# Patient Record
Sex: FEMALE | Race: WHITE
[De-identification: names, ages, dates, MRNs, and addresses within clinical notes are randomized per-mention and may not be internally consistent; named-entity substitution may affect disease eponyms.]

---

## 2019-11-08 ENCOUNTER — HOSPITAL ENCOUNTER (OUTPATIENT)
Dept: HOSPITAL 65 - LAB | Age: 34
Discharge: HOME | End: 2019-11-08
Attending: OBSTETRICS & GYNECOLOGY
Payer: MEDICAID

## 2019-11-08 DIAGNOSIS — Z3A.15: ICD-10-CM

## 2019-11-08 DIAGNOSIS — O09.512: Primary | ICD-10-CM

## 2019-11-08 LAB
BASOPHILS # BLD AUTO: 0 10^3/UL (ref 0–0.1)
BASOPHILS NFR BLD AUTO: 0.2 % (ref 0–0.2)
EOSINOPHIL # BLD AUTO: 0 10^3/UL (ref 0–0.2)
EOSINOPHIL NFR BLD AUTO: 0.4 % (ref 0–5)
ERYTHROCYTE [DISTWIDTH] IN BLOOD BY AUTOMATED COUNT: 13.1 % (ref 11.5–14.5)
HGB BLD-MCNC: 12.9 G/DL (ref 12–15)
LYMPHOCYTES # BLD AUTO: 2.3 10^3/UL (ref 1–4.8)
LYMPHOCYTES NFR BLD AUTO: 20.3 % (ref 24–44)
MCH RBC QN AUTO: 32.4 PG (ref 26–34)
MCHC RBC AUTO-ENTMCNC: 34.3 G/DL (ref 33–37)
MCV RBC AUTO: 94.5 FL (ref 78–100)
MONOCYTES # BLD AUTO: 0.7 10^3/UL (ref 0.3–0.8)
MONOCYTES NFR BLD AUTO: 6.2 % (ref 5–12)
NEUTROPHILS # BLD AUTO: 8 10^3/UL (ref 1.8–7.7)
NEUTROPHILS NFR BLD AUTO: 72.3 % (ref 41–85)
PLATELET # BLD AUTO: 327 10^3/UL (ref 150–400)
PMV BLD AUTO: 8.9 FL (ref 7.8–11)
WBC # BLD AUTO: 11.1 10^3/UL (ref 4.5–11)

## 2019-11-08 PROCEDURE — 86592 SYPHILIS TEST NON-TREP QUAL: CPT

## 2019-11-08 PROCEDURE — 36415 COLL VENOUS BLD VENIPUNCTURE: CPT

## 2019-11-08 PROCEDURE — 86900 BLOOD TYPING SEROLOGIC ABO: CPT

## 2019-11-08 PROCEDURE — 85025 COMPLETE CBC W/AUTO DIFF WBC: CPT

## 2019-11-08 PROCEDURE — 86762 RUBELLA ANTIBODY: CPT

## 2019-11-08 PROCEDURE — 86318 IA INFECTIOUS AGENT ANTIBODY: CPT

## 2019-12-06 ENCOUNTER — HOSPITAL ENCOUNTER (EMERGENCY)
Dept: HOSPITAL 65 - ER | Age: 34
Discharge: HOME | End: 2019-12-06
Payer: MEDICAID

## 2019-12-06 VITALS — DIASTOLIC BLOOD PRESSURE: 82 MMHG | SYSTOLIC BLOOD PRESSURE: 112 MMHG

## 2019-12-06 VITALS — SYSTOLIC BLOOD PRESSURE: 110 MMHG | DIASTOLIC BLOOD PRESSURE: 83 MMHG

## 2019-12-06 VITALS — HEIGHT: 67 IN | WEIGHT: 187 LBS | BODY MASS INDEX: 29.35 KG/M2
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
## 2019-12-06 DIAGNOSIS — O9A.212: ICD-10-CM

## 2019-12-06 DIAGNOSIS — Z3A.19: ICD-10-CM
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
## 2019-12-06 DIAGNOSIS — S39.012A: ICD-10-CM

## 2019-12-06 DIAGNOSIS — O26.892: Primary | ICD-10-CM

## 2019-12-06 PROCEDURE — 99285 EMERGENCY DEPT VISIT HI MDM: CPT

## 2019-12-06 PROCEDURE — 76805 OB US >/= 14 WKS SNGL FETUS: CPT
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
## 2019-12-13 ENCOUNTER — HOSPITAL ENCOUNTER (OUTPATIENT)
Dept: HOSPITAL 65 - RAD | Age: 34
Discharge: HOME | End: 2019-12-13
Attending: OBSTETRICS & GYNECOLOGY
Payer: MEDICAID

## 2019-12-13 DIAGNOSIS — Z3A.20: ICD-10-CM

## 2019-12-13 DIAGNOSIS — O09.522: Primary | ICD-10-CM

## 2019-12-13 PROCEDURE — 76805 OB US >/= 14 WKS SNGL FETUS: CPT

## 2019-12-13 NOTE — DIREP
PROCEDURE:OBSTETRICAL ULTRASOUND, 2nd AND 3rd TRIMESTER

 

TECHNIQUE:Transabdominal ultrasound of the pelvic contents was performed.  

 

COMPARISON:[facility], US, US OB 2 3TRI DETAILED TRANSABD, 

12/06/2019, 12:59 PM.

 

INDICATIONS:Z3A.19 IUP @ 19 WEEKS

 

FINDINGS:

 

FETAL NUMBER:Mancera.

POSITION:Breech

LVP:5.7 cm (normal is 2-10 cm).

PLACENTA:Anterior, no previa.  

CERVIX:4.6 cm, closed.  

HEART RATE:136 bpm

 

BIPARIETAL DIAMETER:4.5 cm (19 W 3 D), 33.4 percentile

HEAD CIRCUMFERENCE:17.3 cm (19 W 6 D), 34.5 percentile

ABD CIRCUMFERENCE:15.6 cm (20 W 3 D), 69.9 percentile

FEMUR LENGTH:3.3 cm (19 W 6 D), 54 percentile

ESTIMATED FETAL WEIGHT:350.5 gm (0 lb, 12oz), 67 percentile

 

ULTRASOUND GA:20 W 1 D

ULTRASOUND SUKHJINDER:April 30, 2020

 

LMP: 7/26/19

CLINICAL GA:                                 20 W 0 D

CLINICAL SUKHJINDER:                               May 1, 2020

 

 

FETAL ANATOMY

CEREBELLUM:2.1 cm

NUCHAL FOLD:5.9 mm

CISTERNA MAGNA:4.3 mm

LATERAL CEREBRAL VENTRICLES:5.7 mm

 

CHOROID PLEXUS:Normal.  

MIDLINE FALX:Normal.  

CAVUM SEPTUM PELLUCIDUM:Present.  

SPINE:Normal

HEART:4 chamber

UPPER LIP:Normal

STOMACH:Present.  

KIDNEYS:Normal - no hydronephrosis.  

BLADDER:Normal.  

UMBILICAL CORD INSERTION:Normal.  

CORD VESSEL NUMBER:3

EXTREMITIES:Present.  

         

 

CONCLUSION:

1.  Single viable IUP 20 weeks 1 day, SUKHJINDER 04/30/2020 and .5 gm (0 lb, 

12oz).  

2.  Breech presentation.  

3.  No fetal anomalies detected.  

4.  Normal percentile growth calculations based on LMP.

 

 

 

Dictated by: MARCELLO Physician on 12/13/2019 at 02:33 PM     

Electronically Signed By: Miriam Whelan MD on 12/13/2019 at 03:32 PM   

   

 

 

 

 

 

bs

## 2020-01-10 ENCOUNTER — HOSPITAL ENCOUNTER (OUTPATIENT)
Dept: HOSPITAL 65 - LAB | Age: 35
Discharge: HOME | End: 2020-01-10
Attending: OBSTETRICS & GYNECOLOGY
Payer: MEDICAID

## 2020-01-10 DIAGNOSIS — O09.522: Primary | ICD-10-CM

## 2020-01-10 DIAGNOSIS — Z3A.24: ICD-10-CM

## 2020-01-10 LAB
ERYTHROCYTE [DISTWIDTH] IN BLOOD BY AUTOMATED COUNT: 12.5 % (ref 11.5–14.5)
MCH RBC QN AUTO: 32.1 PG (ref 26–34)
PLATELET # BLD AUTO: 418 10^3/UL (ref 150–400)

## 2020-01-10 PROCEDURE — 85027 COMPLETE CBC AUTOMATED: CPT

## 2020-01-10 PROCEDURE — 36415 COLL VENOUS BLD VENIPUNCTURE: CPT

## 2020-01-10 PROCEDURE — 82951 GLUCOSE TOLERANCE TEST (GTT): CPT

## 2020-04-22 ENCOUNTER — HOSPITAL ENCOUNTER (OUTPATIENT)
Dept: HOSPITAL 65 - LAB | Age: 35
Discharge: HOME | End: 2020-04-22
Attending: OBSTETRICS & GYNECOLOGY
Payer: MEDICAID

## 2020-04-22 ENCOUNTER — HOSPITAL ENCOUNTER (OUTPATIENT)
Dept: HOSPITAL 65 - NPLAB | Age: 35
Discharge: HOME | End: 2020-04-22
Attending: OBSTETRICS & GYNECOLOGY
Payer: MEDICAID

## 2020-04-22 DIAGNOSIS — Z03.818: Primary | ICD-10-CM

## 2020-04-22 DIAGNOSIS — Z3A.00: ICD-10-CM

## 2020-04-22 DIAGNOSIS — O09.523: Primary | ICD-10-CM

## 2020-04-22 PROCEDURE — 36415 COLL VENOUS BLD VENIPUNCTURE: CPT

## 2020-04-22 PROCEDURE — 86592 SYPHILIS TEST NON-TREP QUAL: CPT

## 2020-04-22 PROCEDURE — 87635 SARS-COV-2 COVID-19 AMP PRB: CPT

## 2020-04-27 ENCOUNTER — HOSPITAL ENCOUNTER (INPATIENT)
Dept: HOSPITAL 65 - LND | Age: 35
LOS: 1 days | Discharge: HOME | DRG: 560 | End: 2020-04-28
Attending: OBSTETRICS & GYNECOLOGY | Admitting: OBSTETRICS & GYNECOLOGY
Payer: MEDICAID

## 2020-04-27 VITALS — BODY MASS INDEX: 34.69 KG/M2 | HEIGHT: 67 IN | WEIGHT: 221 LBS

## 2020-04-27 VITALS — DIASTOLIC BLOOD PRESSURE: 82 MMHG | SYSTOLIC BLOOD PRESSURE: 127 MMHG

## 2020-04-27 DIAGNOSIS — Z3A.39: ICD-10-CM

## 2020-04-27 DIAGNOSIS — Z67.21: ICD-10-CM

## 2020-04-27 LAB
BASOPHILS # BLD AUTO: 0 10^3/UL (ref 0–0.1)
BASOPHILS NFR BLD AUTO: 0.2 % (ref 0–0.2)
EOSINOPHIL # BLD AUTO: 0.1 10^3/UL (ref 0–0.2)
EOSINOPHIL NFR BLD AUTO: 0.7 % (ref 0–5)
ERYTHROCYTE [DISTWIDTH] IN BLOOD BY AUTOMATED COUNT: 13.1 % (ref 11.5–14.5)
LYMPHOCYTES # BLD AUTO: 3.03 10^3/UL1 (ref 1–4.8)
LYMPHOCYTES NFR BLD AUTO: 20.9 % (ref 24–44)
MCH RBC QN AUTO: 31.9 PG (ref 26–34)
MONOCYTES # BLD AUTO: 1.2 10^3/UL (ref 0.3–0.8)
MONOCYTES NFR BLD AUTO: 8.2 % (ref 5–12)
NEUTROPHILS # BLD AUTO: 10 10^3/UL (ref 1.8–7.7)
NEUTROPHILS NFR BLD AUTO: 69 % (ref 41–85)
PLATELET # BLD AUTO: 279 10^3/UL (ref 150–400)

## 2020-04-27 PROCEDURE — 86870 RBC ANTIBODY IDENTIFICATION: CPT

## 2020-04-27 PROCEDURE — 86318 IA INFECTIOUS AGENT ANTIBODY: CPT

## 2020-04-27 PROCEDURE — 36415 COLL VENOUS BLD VENIPUNCTURE: CPT

## 2020-04-27 PROCEDURE — 86592 SYPHILIS TEST NON-TREP QUAL: CPT

## 2020-04-27 PROCEDURE — 87340 HEPATITIS B SURFACE AG IA: CPT

## 2020-04-27 PROCEDURE — 85025 COMPLETE CBC W/AUTO DIFF WBC: CPT

## 2020-04-27 PROCEDURE — 00HU33Z INSERTION OF INFUSION DEVICE INTO SPINAL CANAL, PERCUTANEOUS APPROACH: ICD-10-PCS | Performed by: OBSTETRICS & GYNECOLOGY

## 2020-04-27 PROCEDURE — 86900 BLOOD TYPING SEROLOGIC ABO: CPT

## 2020-04-27 PROCEDURE — 10907ZC DRAINAGE OF AMNIOTIC FLUID, THERAPEUTIC FROM PRODUCTS OF CONCEPTION, VIA NATURAL OR ARTIFICIAL OPENING: ICD-10-PCS | Performed by: OBSTETRICS & GYNECOLOGY

## 2020-04-27 PROCEDURE — 3E033VJ INTRODUCTION OF OTHER HORMONE INTO PERIPHERAL VEIN, PERCUTANEOUS APPROACH: ICD-10-PCS | Performed by: OBSTETRICS & GYNECOLOGY

## 2020-04-27 PROCEDURE — 3E0R3BZ INTRODUCTION OF ANESTHETIC AGENT INTO SPINAL CANAL, PERCUTANEOUS APPROACH: ICD-10-PCS | Performed by: OBSTETRICS & GYNECOLOGY

## 2020-04-27 PROCEDURE — 0KQM0ZZ REPAIR PERINEUM MUSCLE, OPEN APPROACH: ICD-10-PCS | Performed by: OBSTETRICS & GYNECOLOGY

## 2020-04-27 RX ADMIN — DEXTROSE, SODIUM CHLORIDE, SODIUM LACTATE, POTASSIUM CHLORIDE, AND CALCIUM CHLORIDE SCH MLS/HR: 5; .6; .31; .03; .02 INJECTION, SOLUTION INTRAVENOUS at 06:21

## 2020-04-27 RX ADMIN — DEXTROSE, SODIUM CHLORIDE, SODIUM LACTATE, POTASSIUM CHLORIDE, AND CALCIUM CHLORIDE SCH MLS/HR: 5; .6; .31; .03; .02 INJECTION, SOLUTION INTRAVENOUS at 14:21

## 2020-04-27 NOTE — PCM.OBDEL1
OPERATIVE REPORT


OPERATIVE REPORT


39.3- over intact perineum with epidural anesthesia.  Viable female infant 

Apgars 8/9.  Loose nuchal cord reduced at perineum.  Head was delivered. I 

attempted to deliver the body but was unsuccessful.  We waited for the next 

contraction and spontaneously the shoulder presented without patient pushing.  

Patient began pushing and the infant was delivered in its entirety.  Infant was 

handed to mother.  True knot was noted on the cord.  Delayed cord clamping  and 

cut was completed.  Cord blood was obtained.  Placenta was spontaneously 

expelled.  A second-degree perineal laceration was repaired with 3-0 chromic.  

Hemostasis was noted.  Mother and infant are stable.  All counts were correct.  

QBL 10 mm.











JEAN CARLOS EUGENE DO              2020 14:06

## 2020-04-27 NOTE — PCM.HP
OB-Chief Complaint and HPI


Date/Diagnosis


Date:  2020


Time:  09:08


Admit Dx:  


(1) Elective induction of labor planned


SNOMED:  804437973


(2) 39 weeks gestation of pregnancy


ICD Codes:  Z3A.39 - 39 weeks gestation of pregnancy


SNOMED:  97403688


(3) Advanced maternal age (AMA) in pregnancy


SNOMED:  136013923





Chief Complaint/History(PI)


:  2


Para:  1


EDC:  May 1, 2020


Reason for admission:  induction of labor





Past Family/Social History


Patient History:  


Factor V Leiden mutation


  32 MOTHER (Mother is - recently from blood clots.)


No known health problems


  33 FATHER


  G8 BROTHER


  G8 SISTER


Blood Type:  B-


Rubella:  not immune


RPR/VDRL:  Negative


GBS Status:  Negative


HBsAG:  Negative


Provider Note:


34-year-old -0-0-1 at 39.3 presents for IOL.  Patient is feeling occasional

contractions.  Good fetal movement noted.  No LOF or VB. Pregnancy 

uncomplicated.  53 pound weight gain during  period.





AROMclear fluid.





TOCO-1-6 min


FHT-Cat 1





LABS-HIV NR





SVE 70/-2, VTX


VS 98.4,18,75,113/69,95%


ABD-Gravid


EX-FUll ROM, NT, trace edema





A/


35 y/o  @ 39.3


AMA


GBS neg


Blood Type B neg, s/p Rhogam 20


COVID-19 testing negative 20


P/


AROM/Pitocin augmentation


Plans to bottle feed


Plan for Paragard IUP insertion 4-6 weeks PP (BTL at a later date)





OB EXAM


Physical Exam


Vital Signs:  Temperature: 98.4, Source: Oral, Respiratory Rate: 16, BP: 127/82,

Pulse Oximetry: 84, Weight: 221





Vital Signs








  Date Time  Temp Pulse Resp B/P (MAP) Pulse Ox O2 Delivery O2 Flow Rate FiO2


 


20 06:24 98.4 84 16 127/82 (97)  Room Air  








Allergies








Coded Allergies Type Severity Reaction Last Updated Verified


 


  No Known Allergies    20 No








LABS





Laboratory Tests








Test


 20


06:30


 


HIV-1 Antibody


 NON-REACTIVE


(NONREACTIVE)


 


HIV-2 Antibody


 NON-REACTIVE


(NONREACTIVE)

















JEAN CARLOS EUGENE DO              2020 09:17

## 2020-04-28 VITALS — SYSTOLIC BLOOD PRESSURE: 135 MMHG | DIASTOLIC BLOOD PRESSURE: 81 MMHG

## 2020-04-28 VITALS — DIASTOLIC BLOOD PRESSURE: 81 MMHG | SYSTOLIC BLOOD PRESSURE: 135 MMHG

## 2020-04-28 LAB
BASOPHILS # BLD AUTO: 0 10^3/UL (ref 0–0.1)
BASOPHILS NFR BLD AUTO: 0.1 % (ref 0–0.2)
EOSINOPHIL # BLD AUTO: 0.1 10^3/UL (ref 0–0.2)
EOSINOPHIL NFR BLD AUTO: 0.4 % (ref 0–5)
ERYTHROCYTE [DISTWIDTH] IN BLOOD BY AUTOMATED COUNT: 13.2 % (ref 11.5–14.5)
LYMPHOCYTES # BLD AUTO: 2.74 10^3/UL1 (ref 1–4.8)
LYMPHOCYTES NFR BLD AUTO: 16.7 % (ref 24–44)
MCH RBC QN AUTO: 32.2 PG (ref 26–34)
MONOCYTES # BLD AUTO: 1.2 10^3/UL (ref 0.3–0.8)
MONOCYTES NFR BLD AUTO: 7.2 % (ref 5–12)
NEUTROPHILS # BLD AUTO: 12.2 10^3/UL (ref 1.8–7.7)
NEUTROPHILS NFR BLD AUTO: 74.6 % (ref 41–85)
PLATELET # BLD AUTO: 231 10^3/UL (ref 150–400)

## 2020-04-28 NOTE — PRM.DC
OB Discharge Summary


Discharge Summary


Date of Arrival on Unit:  2020


Reason for Visit:  IOL


Discharge Date:  2020


Patient History:  


Factor V Leiden mutation


  32 MOTHER (Mother is - recently from blood clots.)


No known health problems


  33 FATHER


  G8 BROTHER


  G8 SISTER


Procedure(s) & Date(s)


 2020


Complications:  No Complications


Abnormal Lab Results


Activity as tolerated, pelvic rest. OTC Motrin prn,


Medications:  Other


Discharge Diagnosis:  Status Post 


Discharge Disposition:  Stable


Hospital Course


PPD # 1





Patient is doing well today.  Lying in bed with FOB and infant daughter.  States

she is having minimal cramping and has not required pain medication.  She 

declines prescription for pain medication at home.  Lochia appropriate.  

Ambulating and urinating without difficulty.  Tolerating regular diet.  Bottle 

feeding only.





/69,82,96%,98.5


ABD-Firm fundus


EX-1 + pitting edema bilateral, Negative Kraig's, NT





A/


33 y/o  @ 39.3, PPD # 1


AMA


GBS neg


Blood Type B neg, s/p Rhogam 20


COVID-19 testing negative 20


Bottle feeding


P/


Continue PP care. 


Plan for Paragard IUP insertion 4-6 weeks PP (BTL at a later date), reviewed 

with pt. and discussed current COVID-19 restrictions


D/C to home today 


2 week Pradip f/u











JEAN CARLOS EUGENE DO              2020 09:47

## 2020-04-28 NOTE — NUR
pt epidural has been removed after delivery; tip intact according to L&D nurse; pt 
ambulating and voiding; pt denies any pain

## 2020-06-17 ENCOUNTER — HOSPITAL ENCOUNTER (OUTPATIENT)
Dept: HOSPITAL 65 - SURG | Age: 35
Discharge: HOME | DRG: 761 | End: 2020-06-17
Attending: OBSTETRICS & GYNECOLOGY
Payer: MEDICAID

## 2020-06-17 VITALS — SYSTOLIC BLOOD PRESSURE: 126 MMHG | DIASTOLIC BLOOD PRESSURE: 81 MMHG

## 2020-06-17 VITALS — SYSTOLIC BLOOD PRESSURE: 108 MMHG | DIASTOLIC BLOOD PRESSURE: 73 MMHG

## 2020-06-17 VITALS — SYSTOLIC BLOOD PRESSURE: 97 MMHG | DIASTOLIC BLOOD PRESSURE: 51 MMHG

## 2020-06-17 VITALS — DIASTOLIC BLOOD PRESSURE: 44 MMHG | SYSTOLIC BLOOD PRESSURE: 108 MMHG

## 2020-06-17 VITALS — SYSTOLIC BLOOD PRESSURE: 118 MMHG | DIASTOLIC BLOOD PRESSURE: 55 MMHG

## 2020-06-17 VITALS — SYSTOLIC BLOOD PRESSURE: 124 MMHG | DIASTOLIC BLOOD PRESSURE: 64 MMHG

## 2020-06-17 VITALS — SYSTOLIC BLOOD PRESSURE: 134 MMHG | DIASTOLIC BLOOD PRESSURE: 69 MMHG

## 2020-06-17 VITALS — DIASTOLIC BLOOD PRESSURE: 60 MMHG | SYSTOLIC BLOOD PRESSURE: 112 MMHG

## 2020-06-17 VITALS — SYSTOLIC BLOOD PRESSURE: 121 MMHG | DIASTOLIC BLOOD PRESSURE: 87 MMHG

## 2020-06-17 VITALS — SYSTOLIC BLOOD PRESSURE: 105 MMHG | DIASTOLIC BLOOD PRESSURE: 40 MMHG

## 2020-06-17 VITALS — DIASTOLIC BLOOD PRESSURE: 48 MMHG | SYSTOLIC BLOOD PRESSURE: 100 MMHG

## 2020-06-17 VITALS — DIASTOLIC BLOOD PRESSURE: 52 MMHG | SYSTOLIC BLOOD PRESSURE: 98 MMHG

## 2020-06-17 VITALS — SYSTOLIC BLOOD PRESSURE: 108 MMHG | DIASTOLIC BLOOD PRESSURE: 77 MMHG

## 2020-06-17 VITALS — SYSTOLIC BLOOD PRESSURE: 106 MMHG | DIASTOLIC BLOOD PRESSURE: 77 MMHG

## 2020-06-17 VITALS — SYSTOLIC BLOOD PRESSURE: 117 MMHG | DIASTOLIC BLOOD PRESSURE: 68 MMHG

## 2020-06-17 VITALS — DIASTOLIC BLOOD PRESSURE: 62 MMHG | SYSTOLIC BLOOD PRESSURE: 104 MMHG

## 2020-06-17 VITALS — SYSTOLIC BLOOD PRESSURE: 83 MMHG | DIASTOLIC BLOOD PRESSURE: 55 MMHG

## 2020-06-17 VITALS — SYSTOLIC BLOOD PRESSURE: 85 MMHG | DIASTOLIC BLOOD PRESSURE: 55 MMHG

## 2020-06-17 VITALS — SYSTOLIC BLOOD PRESSURE: 99 MMHG | DIASTOLIC BLOOD PRESSURE: 58 MMHG

## 2020-06-17 VITALS — BODY MASS INDEX: 26.68 KG/M2 | WEIGHT: 170 LBS | HEIGHT: 67 IN

## 2020-06-17 VITALS — SYSTOLIC BLOOD PRESSURE: 96 MMHG | DIASTOLIC BLOOD PRESSURE: 68 MMHG

## 2020-06-17 VITALS — SYSTOLIC BLOOD PRESSURE: 125 MMHG | DIASTOLIC BLOOD PRESSURE: 64 MMHG

## 2020-06-17 VITALS — SYSTOLIC BLOOD PRESSURE: 122 MMHG | DIASTOLIC BLOOD PRESSURE: 79 MMHG

## 2020-06-17 VITALS — DIASTOLIC BLOOD PRESSURE: 65 MMHG | SYSTOLIC BLOOD PRESSURE: 113 MMHG

## 2020-06-17 DIAGNOSIS — Z98.890: ICD-10-CM

## 2020-06-17 DIAGNOSIS — Z30.2: Primary | ICD-10-CM

## 2020-06-17 DIAGNOSIS — Z79.899: ICD-10-CM

## 2020-06-17 LAB
ERYTHROCYTE [DISTWIDTH] IN BLOOD BY AUTOMATED COUNT: 12.9 % (ref 11.5–14.5)
MCH RBC QN AUTO: 32 PG (ref 26–34)
PLATELET # BLD AUTO: 304 10^3/UL (ref 150–400)

## 2020-06-17 PROCEDURE — 58605 DIVISION OF FALLOPIAN TUBE: CPT

## 2020-06-17 PROCEDURE — 85027 COMPLETE CBC AUTOMATED: CPT

## 2020-06-17 PROCEDURE — 58670 LAPAROSCOPY TUBAL CAUTERY: CPT

## 2020-06-17 PROCEDURE — 86870 RBC ANTIBODY IDENTIFICATION: CPT

## 2020-06-17 PROCEDURE — 86900 BLOOD TYPING SEROLOGIC ABO: CPT

## 2020-06-17 PROCEDURE — A4217 STERILE WATER/SALINE, 500 ML: HCPCS

## 2020-06-17 PROCEDURE — 36415 COLL VENOUS BLD VENIPUNCTURE: CPT

## 2020-06-17 PROCEDURE — 84703 CHORIONIC GONADOTROPIN ASSAY: CPT

## 2020-06-17 RX ADMIN — SODIUM CHLORIDE, SODIUM LACTATE, POTASSIUM CHLORIDE, AND CALCIUM CHLORIDE SCH MLS/HR: 600; 310; 30; 20 INJECTION, SOLUTION INTRAVENOUS at 07:14

## 2020-06-17 RX ADMIN — SODIUM CHLORIDE, SODIUM LACTATE, POTASSIUM CHLORIDE, AND CALCIUM CHLORIDE SCH MLS/HR: 600; 310; 30; 20 INJECTION, SOLUTION INTRAVENOUS at 10:50

## 2020-06-17 NOTE — PRM.OPH
OPERATIVE REPORT


OPERATIVE REPORT


DATE OF SURGERY: 6/17/2020








PREOPERATIVE DIAGNOSIS: Request for permanent sterilization





POSTOPERATIVE DIAGNOSIS: Same





PROCEDURE: Laparoscopic bilateral tubal ligation via risk reducing bilateral 

salpingectomy





SURGEON:  Jean Carlos Pope DO





ASSISTANT: Irving Natarajan





ANESTHESIA: Spinal and general





BLOOD LOSS: 20 mL.





SPECIMENS: Bilateral fallopian tubes





URINE: To be determined in PACU





DISPOSITION: Stable





PROCEDURE IN DETAIL: The patient was brought to the operating room where spinal 

anesthesia was found to be adequate.  She was then prepped and draped in the nor

mal sterile fashion and placed in the supine lithotomy position.  Time-out was 

performed.  I proceeded to place a weighted speculum in the vaginal vault.  The 

anterior lip of the cervix was identified and grasped and a single-tooth 

tenaculum was placed.  The uterus was sounded to 8 cm.  The HUMI uterine 

manipulator was then placed and bulb inflated.  The single-tooth tenaculum and 

weighted speculum were then removed from the vaginal vault.  Hemostasis was 

noted.  I then changed my gloves and turned my attention to the abdomen.  All 

incisions were first infiltrated with local anesthetic prior to incision.  A 

vertical subumbilical skin incision was made with a knife and a hemostat was 

used to separate the tissue.  The Camera was inserted under direct visualization

with a #5 trocar.  The gas was started and pneumoperitoneum was obtained.  The 

abdomen was surveyed.  Noted on the right ovary is a suspected simple cyst.  

Also an abnormal area was noted on the liver.  Pictures were taken.  I then 

turned my attention to the right abdomen where internal visualization was used 

to trans-illuminate the skin to place the right lower lateral incision.  The 

incision was made and the area started to bleed just underneath the skin.  This 

area was tied off with 0 Vicryl suture and hemostasis was noted.  Ecchymosis was

noted internally and was watched throughout the procedure.  I chose to abandon 

that incision and move slightly medial and placed another skin incision to 

accommodate the #8  trocar, which was placed under direct visualization.  A 

second superior incision was made to accommodate the #5 trocar which was also 

placed under direct visualization.  Bilateral tubes and ovaries were easily 

visualized.  The grasper was used to grasp the right fallopian tube near the 

fimbriated end.  The LigaSure apparatus was used to transect the fallopian tube 

starting at the distal end, moving along the mesosalpinx until its transection 

at the cornua.  Hemostasis was noted.  The fallopian tube was removed in its 

entirety and taken off the field.  In a similar fashion, on the left, the 

fallopian tube was visualized and grasped near its fimbriated end.  The LigaSure

apparatus then was used to transect and cauterize the fallopian tube along the 

mesosalpinx. Moving towards the most proximal end and transecting at the uterus.

 A small area was noted to be oozing therefore the tip of LigaSure was used to 

cauterize these areas and hemostasis was noted.  The fallopian tube was grasped 

and removed out of the #8 trocar and taken off the field.  The transected areas 

were both inspected and hemostasis was noted, therefor the procedure was 

complete.  I then inspected the area of ecchymosis and it was stable. No active 

bleeding was noted on the skin or in the abdomen.  The trocar was removed under 

direct visualization and again hemostasis was noted.  The lateral trochars were 

also removed under direct visualization and the pneumoperitoneum was released.  

The skin incisions were closed with 4-0 Monocryl suture.  Steri-Strips and Band-

Aids were placed.  The HUMI uterine manipulator was deflated and removed from 

the cervix.  The patient tolerated the procedure well. She was taken to the 

recovery room in stable condition.











JEAN CARLOS POPE DO              Jun 17, 2020 10:27